# Patient Record
Sex: FEMALE | URBAN - METROPOLITAN AREA
[De-identification: names, ages, dates, MRNs, and addresses within clinical notes are randomized per-mention and may not be internally consistent; named-entity substitution may affect disease eponyms.]

---

## 2019-03-26 ENCOUNTER — APPOINTMENT (RX ONLY)
Dept: URBAN - METROPOLITAN AREA CLINIC 29 | Facility: CLINIC | Age: 24
Setting detail: DERMATOLOGY
End: 2019-03-26

## 2019-03-26 DIAGNOSIS — Z41.9 ENCOUNTER FOR PROCEDURE FOR PURPOSES OTHER THAN REMEDYING HEALTH STATE, UNSPECIFIED: ICD-10-CM

## 2019-03-26 PROCEDURE — ? COSMETIC CONSULTATION: LHR

## 2019-03-26 PROCEDURE — ? OTHER

## 2019-03-26 NOTE — HPI: FACE (AGING FACE)
How Severe Is It?: mild
Is This A New Presentation, Or A Follow-Up?: Aging Face
Additional History: none of the following: auto immune, diabetes, hepatitis, HIV, HSV, pregnancy

## 2019-03-26 NOTE — PROCEDURE: OTHER
Other (Free Text): concerned with acne scarring. Quoted next season special starting 4/1- $775 for 2 microneedling prp tx's. patient gets chemical peels at another office in [place]. F2[employer] brand peel (not familiar) uses [employer] brand products cleanse and moisturizer. did not know name of other product she uses on her skin. patient still has some breakouts..
Note Text (......Xxx Chief Complaint.): This diagnosis correlates with the
Detail Level: Zone